# Patient Record
Sex: MALE | Race: ASIAN | NOT HISPANIC OR LATINO | URBAN - METROPOLITAN AREA
[De-identification: names, ages, dates, MRNs, and addresses within clinical notes are randomized per-mention and may not be internally consistent; named-entity substitution may affect disease eponyms.]

---

## 2019-01-28 ENCOUNTER — HOSPITAL ENCOUNTER (EMERGENCY)
Facility: HOSPITAL | Age: 84
Discharge: HOME | End: 2019-01-28
Attending: EMERGENCY MEDICINE
Payer: MEDICAID

## 2019-01-28 VITALS
HEART RATE: 81 BPM | RESPIRATION RATE: 19 BRPM | SYSTOLIC BLOOD PRESSURE: 179 MMHG | DIASTOLIC BLOOD PRESSURE: 90 MMHG | OXYGEN SATURATION: 99 %

## 2019-01-28 DIAGNOSIS — E16.2 HYPOGLYCEMIA: Primary | ICD-10-CM

## 2019-01-28 LAB — GLUCOSE SERPL-MCNC: 249 MG/DL (ref 65–140)

## 2019-01-28 PROCEDURE — 99284 EMERGENCY DEPT VISIT MOD MDM: CPT

## 2019-01-28 PROCEDURE — 82948 REAGENT STRIP/BLOOD GLUCOSE: CPT

## 2019-01-28 NOTE — ED PROVIDER NOTES
History  Chief Complaint   Patient presents with    Hypoglycemia - no symptoms     Patient on bus from [facility] to [facility]  Per bystanders patient was sleeping during trip  Hard to arouse upon arrival to [facility]  Patient found with BS of 50 1 bag of d5 250 ML given by EMS  Patient is a male who presents with hypoglycemia  Patient was in his normal state of health until earlier today on a bus  He was taking the bus from [place] to the [facility]  On arrival to the [place], he apparently was difficult to arouse on the bus  Patient is an insulin-dependent type 2 diabetic  He took 30 units of unknown type of insulin this morning  He ate breakfast at approximately 7:00 a m  but did not eat anything since then  He had hard candies in his pocket in order to keep his blood sugar up but forgot to take them  He took a nap on the bus and then the next thing he remembers is being woken up by EMS  Per EMS, the patient had initial blood sugar of 50 and he was given a bag of D5  Upon initial evaluation in the emergency department he had a blood sugar of 95  Otherwise, the patient is completely asymptomatic  He denies any headache, altered mental status, nausea, vomiting, chest pain, dyspnea, abdominal pain  He denies any recent illnesses, fevers, chills  Denies any recent trauma  He denies polydipsia or polyuria  He denies any medical history other than diabetes  He says he has never been hospitalized for diabetes  Patient is acting appropriately answer my questions affectively  None       Past Medical History:   Diagnosis Date    Diabetes mellitus ([facility] [address] )        History reviewed  No pertinent surgical history  History reviewed  No pertinent family history  I have reviewed and agree with the history as documented      Social History   Substance Use Topics    Smoking status: Never Smoker    Smokeless tobacco: Never Used    Alcohol use No        Review of Systems   Constitutional: Negative for chills, diaphoresis, fatigue and fever  HENT: Negative for drooling, facial swelling, sore throat and trouble swallowing  Eyes: Negative for photophobia  Respiratory: Negative for cough, choking, chest tightness, shortness of breath, wheezing and stridor  Cardiovascular: Negative for chest pain, palpitations and leg swelling  Gastrointestinal: Negative for abdominal distention, abdominal pain, diarrhea, nausea and vomiting  Genitourinary: Negative for dysuria  Musculoskeletal: Negative for back pain, neck pain and neck stiffness  Skin: Negative for color change, pallor and rash  Neurological: Negative for dizziness, speech difficulty, weakness, light-headedness, numbness and headaches  Hematological: Negative for adenopathy  Psychiatric/Behavioral: Negative for agitation  All other systems reviewed and are negative  Physical Exam  ED Triage Vitals   Temp Pulse Respirations Blood Pressure SpO2   -- 01/28/19 1339 01/28/19 1339 01/28/19 1339 01/28/19 1339    81 19 (!) 179/90 99 %      Temp src Heart Rate Source Patient Position - Orthostatic VS BP Location FiO2 (%)   -- 01/28/19 1339 01/28/19 1339 01/28/19 1339 --    Monitor Sitting Right arm       Pain Score       01/28/19 1305       No Pain           Orthostatic Vital Signs  Vitals:    01/28/19 1339   BP: (!) 179/90   Pulse: 81   Patient Position - Orthostatic VS: Sitting       Physical Exam   Constitutional: He is oriented to person, place, and time  He appears well-developed and well-nourished  No distress  HENT:   Head: Normocephalic  Eyes: Pupils are equal, round, and reactive to light  EOM are normal    Neck: Normal range of motion  Neck supple  Cardiovascular: Normal rate, regular rhythm, normal heart sounds and intact distal pulses  Exam reveals no gallop and no friction rub  No murmur heard  Pulmonary/Chest: Effort normal and breath sounds normal    Abdominal: Soft   Bowel sounds are normal  He exhibits no distension  There is no tenderness  There is no guarding  Musculoskeletal: Normal range of motion  Neurological: He is alert and oriented to person, place, and time  No cranial nerve deficit or sensory deficit  He exhibits normal muscle tone  Skin: Capillary refill takes less than 2 seconds  Psychiatric: He has a normal mood and affect  His behavior is normal  Judgment and thought content normal    Vitals reviewed  ED Medications  Medications - No data to display    Diagnostic Studies  Results Reviewed     Procedure Component Value Units Date/Time    Fingerstick Glucose (POCT) [[id number]]  (Abnormal) Collected:  01/28/19 1518    Lab Status:  Final result Updated:  01/28/19 1521     POC Glucose 249 (H) mg/dl                  No orders to display         Procedures  Procedures      Phone Consults  ED Phone Contact    ED Course  ED Course as of Jan 28 2110 Mon Jan 28, 2019   1521 RepeatBlood sugar 250            Identification of Seniors at Risk      Most Recent Value   (ISAR) Identification of Seniors at Risk   Before the illness or injury that brought you to the Emergency, did you need someone to help you on a regular basis? 0 Filed at: 01/28/2019 1344   In the last 24 hours, have you needed more help than usual?  0 Filed at: 01/28/2019 1344   Have you been hospitalized for one or more nights during the past 6 months?  --   In general, do you see well?  --   In general, do you have serious problems with your memory? --   Do you take more than three different medications every day?  --   ISAR Score  0 Filed at: 01/28/2019 1344                          MDM  Number of Diagnoses or Management Options  Hypoglycemia: new and does not require workup  Diagnosis management comments: Initial evaluation:  Patient is a male who presents with hypoglycemia  The patient has not been hypoglycemic here in the emergency department  Initial blood sugar was 95  We will continue to watch the patient    Patient has been ambulatory here in the emergency department and he is completely asymptomatic  He will then be reassessed  final evaluation: Patient is a male who presents with hypoglycemia  Patient was observed for 2 5 hours in the emergency department and repeat blood sugar was 250  Patient was completely asymptomatic at the time of discharge  Amount and/or Complexity of Data Reviewed  Clinical lab tests: ordered and reviewed  Tests in the radiology section of CPT®: ordered and reviewed    Risk of Complications, Morbidity, and/or Mortality  Presenting problems: low  Diagnostic procedures: low  Management options: low    Patient Progress  Patient progress: improved    CritCare Time    Disposition  Final diagnoses:   Hypoglycemia     Time reflects when diagnosis was documented in both MDM as applicable and the Disposition within this note     Time User Action Codes Description Comment    1/28/2019  3:21 PM Mary Sasm Add [E16 2] Hypoglycemia       ED Disposition     ED Disposition Condition Comment    Discharge  [address] discharge to home/self care  Condition at discharge: Good        Follow-up Information     Follow up With Specialties Details Why [address] [facility] Emergency Department Emergency Medicine  If he developed difficulty thinking, loss of consciousness  [address] [place] [address]  [phone]  ED, [address] [place] [address], [facility]          There are no discharge medications for this patient  No discharge procedures on file  ED Provider  Attending physically available and evaluated [address] I managed the patient along with the ED Attending      Electronically Signed by         Miriam Lackey MD  01/28/19 9193

## 2019-01-28 NOTE — ED ATTENDING ATTESTATION
Nicky Mcintyre MD, saw and evaluated the patient  I have discussed the patient with the resident/non-physician practitioner and agree with the resident's/non-physician practitioner's findings, Plan of Care, and MDM as documented in the resident's/non-physician practitioner's note, except where noted  All available labs and Radiology studies were reviewed  At this point I agree with the current assessment done in the Emergency Department  I have conducted an independent evaluation of this patient a history and physical is as follows:    Patient brought in for evaluation after taking a bus from [place] to the [facility]  Upon arrival, patient was difficult to arouse and was found to be hypoglycemic  Patient states that he took insulin this morning but did not eat since breakfast   Patient was given D5 in the ambulance and is currently asymptomatic  No additional complaints  Exam: AAOx3, NAD, RRR, CTA, S/NT/ND, no motor/sensory deficits  A/P:  Hypoglycemia, resolved  Will repeat blood sugar and give p o      Critical Care Time  CritCare Time    Procedures

## 2019-01-28 NOTE — ED NOTES
Interpretation through  patient states he was on the way from the [facility] from [address] [place] [address] Road and felt his blood sugar dropping and ate candy  Patient fell asleep on bus and was hard to arouse upon arrival to [facility]  EMS call found with blood sugar of 50  Patient given d5 250 ml via infusion  Patient states he has no other complaints and feels fine  Patient provided lunch box awaiting physician evaluation        Malu Ramírez RN  01/28/19 121 Katie Regalado RN  01/28/19 5941